# Patient Record
Sex: FEMALE | Race: ASIAN | NOT HISPANIC OR LATINO | URBAN - METROPOLITAN AREA
[De-identification: names, ages, dates, MRNs, and addresses within clinical notes are randomized per-mention and may not be internally consistent; named-entity substitution may affect disease eponyms.]

---

## 2024-06-10 ENCOUNTER — EMERGENCY (EMERGENCY)
Facility: HOSPITAL | Age: 34
LOS: 1 days | Discharge: PSYCHIATRIC OR SUBSTANCE USE FACILITY | End: 2024-06-10
Attending: STUDENT IN AN ORGANIZED HEALTH CARE EDUCATION/TRAINING PROGRAM
Payer: MEDICAID

## 2024-06-10 VITALS
SYSTOLIC BLOOD PRESSURE: 117 MMHG | OXYGEN SATURATION: 98 % | HEART RATE: 73 BPM | DIASTOLIC BLOOD PRESSURE: 78 MMHG | RESPIRATION RATE: 16 BRPM | TEMPERATURE: 98 F

## 2024-06-10 LAB
AMPHET UR-MCNC: NEGATIVE — SIGNIFICANT CHANGE UP
ANION GAP SERPL CALC-SCNC: 18 MMOL/L — HIGH (ref 5–17)
APAP SERPL-MCNC: <15 UG/ML — SIGNIFICANT CHANGE UP (ref 10–30)
APPEARANCE UR: CLEAR — SIGNIFICANT CHANGE UP
BARBITURATES UR SCN-MCNC: NEGATIVE — SIGNIFICANT CHANGE UP
BASOPHILS # BLD AUTO: 0.03 K/UL — SIGNIFICANT CHANGE UP (ref 0–0.2)
BASOPHILS NFR BLD AUTO: 0.4 % — SIGNIFICANT CHANGE UP (ref 0–2)
BENZODIAZ UR-MCNC: NEGATIVE — SIGNIFICANT CHANGE UP
BILIRUB UR-MCNC: NEGATIVE — SIGNIFICANT CHANGE UP
BUN SERPL-MCNC: 10 MG/DL — SIGNIFICANT CHANGE UP (ref 7–23)
CALCIUM SERPL-MCNC: 9.7 MG/DL — SIGNIFICANT CHANGE UP (ref 8.4–10.5)
CHLORIDE SERPL-SCNC: 99 MMOL/L — SIGNIFICANT CHANGE UP (ref 96–108)
CO2 SERPL-SCNC: 22 MMOL/L — SIGNIFICANT CHANGE UP (ref 22–31)
COCAINE METAB.OTHER UR-MCNC: NEGATIVE — SIGNIFICANT CHANGE UP
COLOR SPEC: YELLOW — SIGNIFICANT CHANGE UP
CREAT SERPL-MCNC: 0.58 MG/DL — SIGNIFICANT CHANGE UP (ref 0.5–1.3)
DIFF PNL FLD: NEGATIVE — SIGNIFICANT CHANGE UP
EGFR: 122 ML/MIN/1.73M2 — SIGNIFICANT CHANGE UP
EOSINOPHIL # BLD AUTO: 0.04 K/UL — SIGNIFICANT CHANGE UP (ref 0–0.5)
EOSINOPHIL NFR BLD AUTO: 0.5 % — SIGNIFICANT CHANGE UP (ref 0–6)
ETHANOL SERPL-MCNC: <10 MG/DL — SIGNIFICANT CHANGE UP (ref 0–10)
GLUCOSE SERPL-MCNC: 124 MG/DL — HIGH (ref 70–99)
GLUCOSE UR QL: NEGATIVE MG/DL — SIGNIFICANT CHANGE UP
HCG SERPL-ACNC: <2 MIU/ML — SIGNIFICANT CHANGE UP
HCT VFR BLD CALC: 41.3 % — SIGNIFICANT CHANGE UP (ref 34.5–45)
HGB BLD-MCNC: 13.8 G/DL — SIGNIFICANT CHANGE UP (ref 11.5–15.5)
IMM GRANULOCYTES NFR BLD AUTO: 0.5 % — SIGNIFICANT CHANGE UP (ref 0–0.9)
KETONES UR-MCNC: 15 MG/DL
LEUKOCYTE ESTERASE UR-ACNC: NEGATIVE — SIGNIFICANT CHANGE UP
LYMPHOCYTES # BLD AUTO: 1.11 K/UL — SIGNIFICANT CHANGE UP (ref 1–3.3)
LYMPHOCYTES # BLD AUTO: 14.7 % — SIGNIFICANT CHANGE UP (ref 13–44)
MCHC RBC-ENTMCNC: 32.4 PG — SIGNIFICANT CHANGE UP (ref 27–34)
MCHC RBC-ENTMCNC: 33.4 GM/DL — SIGNIFICANT CHANGE UP (ref 32–36)
MCV RBC AUTO: 96.9 FL — SIGNIFICANT CHANGE UP (ref 80–100)
METHADONE UR-MCNC: NEGATIVE — SIGNIFICANT CHANGE UP
MONOCYTES # BLD AUTO: 0.5 K/UL — SIGNIFICANT CHANGE UP (ref 0–0.9)
MONOCYTES NFR BLD AUTO: 6.6 % — SIGNIFICANT CHANGE UP (ref 2–14)
NEUTROPHILS # BLD AUTO: 5.81 K/UL — SIGNIFICANT CHANGE UP (ref 1.8–7.4)
NEUTROPHILS NFR BLD AUTO: 77.3 % — HIGH (ref 43–77)
NITRITE UR-MCNC: NEGATIVE — SIGNIFICANT CHANGE UP
NRBC # BLD: 0 /100 WBCS — SIGNIFICANT CHANGE UP (ref 0–0)
OPIATES UR-MCNC: NEGATIVE — SIGNIFICANT CHANGE UP
OXYCODONE UR-MCNC: NEGATIVE — SIGNIFICANT CHANGE UP
PCP SPEC-MCNC: SIGNIFICANT CHANGE UP
PCP UR-MCNC: NEGATIVE — SIGNIFICANT CHANGE UP
PH UR: 7.5 — SIGNIFICANT CHANGE UP (ref 5–8)
PLATELET # BLD AUTO: 283 K/UL — SIGNIFICANT CHANGE UP (ref 150–400)
POTASSIUM SERPL-MCNC: 3.6 MMOL/L — SIGNIFICANT CHANGE UP (ref 3.5–5.3)
POTASSIUM SERPL-SCNC: 3.6 MMOL/L — SIGNIFICANT CHANGE UP (ref 3.5–5.3)
PROT UR-MCNC: NEGATIVE MG/DL — SIGNIFICANT CHANGE UP
RBC # BLD: 4.26 M/UL — SIGNIFICANT CHANGE UP (ref 3.8–5.2)
RBC # FLD: 12.5 % — SIGNIFICANT CHANGE UP (ref 10.3–14.5)
SALICYLATES SERPL-MCNC: <2 MG/DL — LOW (ref 15–30)
SARS-COV-2 RNA SPEC QL NAA+PROBE: SIGNIFICANT CHANGE UP
SODIUM SERPL-SCNC: 139 MMOL/L — SIGNIFICANT CHANGE UP (ref 135–145)
SP GR SPEC: 1.01 — SIGNIFICANT CHANGE UP (ref 1–1.03)
THC UR QL: NEGATIVE — SIGNIFICANT CHANGE UP
UROBILINOGEN FLD QL: 0.2 MG/DL — SIGNIFICANT CHANGE UP (ref 0.2–1)
WBC # BLD: 7.53 K/UL — SIGNIFICANT CHANGE UP (ref 3.8–10.5)
WBC # FLD AUTO: 7.53 K/UL — SIGNIFICANT CHANGE UP (ref 3.8–10.5)

## 2024-06-10 PROCEDURE — 99285 EMERGENCY DEPT VISIT HI MDM: CPT

## 2024-06-10 NOTE — ED PROVIDER NOTE - PROGRESS NOTE DETAILS
Attending MD Tovar: Reassessed patient, she states her mood is good today.  She states after sleeping she feels much better.  Explained to her preliminary recommendations for voluntary psychiatric admission, at the time of my discussion with patient she is amenable to this.  Asked if she needs anything she states now.  Patient is awaiting transfer to psychiatric facility pending bed availability.  Medically cleared. Ativan 1 mg every 6 hours as needed anxiety is ordered as per psychiatry notation recommendations.

## 2024-06-10 NOTE — ED PROVIDER NOTE - NS ED ROS FT
GENERAL: No fever, no chills  EYES: No change in vision  HEENT: No trouble swallowing or speaking  CARDIAC: No chest pain  PULMONARY: No cough, no SOB  GI: No abdominal pain, no nausea, no vomiting, no diarrhea, no constipation  : No changes in urination  SKIN: No rashes  NEURO: No headache, no numbness  MSK: No joint pain  Otherwise as HPI or negative. Denies pain

## 2024-06-10 NOTE — ED BEHAVIORAL HEALTH NOTE - BEHAVIORAL HEALTH NOTE
================  FOR EACH COLLATERAL   ================  NAME:     NUMBER:  RELATIONSHIP:  RELIABILITY:  Patient gives permission to obtain collateral from _____:  (  ) Yes  (  )  No  Rationale for overriding objection            (  ) Lack of capacity. Details: ________            (  ) Assessing risk of danger to self/others. Details: ________  Rationale for selecting specific collateral source            (  ) Potential to impact risk of danger to self/others and no alternative equivalent. Details:  Collateral has requested that the information provided remain confidential: Yes [  ] No [  ]  Collateral has provided information that patient is/may be unaware of: Yes [  ] No [  ] ================  FOR EACH COLLATERAL   ================  NAME: Good     NUMBER: [phone]   COMMENTS: Mandarin  utilized (Erna ID#: [id number])  RELATIONSHIP:   RELIABILITY: High  Patient gives permission to obtain collateral from :  ( x ) Yes  (  )  No  Rationale for overriding objection            (  ) Lack of capacity. Details: ________            (  ) Assessing risk of danger to self/others. Details: ________  Rationale for selecting specific collateral source            (  ) Potential to impact risk of danger to self/others and no alternative equivalent. Details:  Collateral has requested that the information provided remain confidential: Yes [  ] No [ x ]  Collateral has provided information that patient is/may be unaware of: Yes [  ] No [ x ]    Patient is a 34F domiciled with her  whom she shares a 11yo daughter with, is self-employed as a business owner for a furniture store, no PMHx, no PPHx, no legal/ACS hx, no substance/ETOH use, no access to firearms.      stated that the patient came to the ER via EMS after attempting to visit a local healthcare clinic ([facility]) after experiencing acute onset of anxiety starting ~3 days ago.  states the episode appeared to be abrupt in nature, and is unsure of what stressors may have contributed.  states that a potential stressor may be some challenges patient has been experiencing this past year while running her business.  adds that the patient has experienced 2 infant deaths 10yrs ago 1 month of patient being born.  denies domestic issues at home, states patient's relationship continues to be stable with all family members, and sleep/appetite are stable.  denies patient endorsed SI/HI/AVH, denies past SA/self-injury.      denies acute psychiatric safety concerns.

## 2024-06-10 NOTE — ED PROVIDER NOTE - ATTENDING CONTRIBUTION TO CARE
see note    [id number] Rose Wen   34 F w/ no pmh presents to the er w/ inability to sleep for 1 week, pt had suicidal thoughts per EMS, she denies these thoughts she states she has distrust of others, denies hi, no AH, no VH. pt w/ no arm/leg weakness numbness no lightheadedness  on exam pt is awake and alert oriented x3, has clear lungs soft abdomen, nontoxic appearing      pt to [facility]. imaging and reassessment.   Plan for psych

## 2024-06-10 NOTE — ED PROVIDER NOTE - OBJECTIVE STATEMENT
34-year-old woman 34-year-old woman with no PMH presenting due to feelings of loneliness, distress, not wanting to live anymore.  Patient denies suicidal or homicidal ideation, states that she has felt this way in the past.  Denies fevers, chills, nausea, vomiting, diarrhea, chest pain, SOB, abdominal pain. Denies safety concerns at home, denies feeling that people are trying to hurt her. However despite using , patient evades questions, appears to minimize symptoms.

## 2024-06-10 NOTE — ED PROVIDER NOTE - CLINICAL SUMMARY MEDICAL DECISION MAKING FREE TEXT BOX
Josh, PGY3 -  34-year-old woman presenting with SI, no suicidal attempts or homicidal ideation.  Labs, psych consult, reassess.  Will wait for the psych recommendations. *The above represents an initial assessment/impression. Please refer to progress notes for potential changes in patient clinical course*

## 2024-06-10 NOTE — ED ADULT NURSE NOTE - NSFALLUNIVINTERV_ED_ALL_ED
Bed/Stretcher in lowest position, wheels locked, appropriate side rails in place/Call bell, personal items and telephone in reach/Instruct patient to call for assistance before getting out of bed/chair/stretcher/Non-slip footwear applied when patient is off stretcher/[place] to call system/Physically safe environment - no spills, clutter or unnecessary equipment/Purposeful proactive rounding/Room/bathroom lighting operational, light cord in reach

## 2024-06-11 ENCOUNTER — INPATIENT (INPATIENT)
Facility: HOSPITAL | Age: 34
LOS: 0 days | Discharge: HOME | End: 2024-06-12
Attending: PSYCHIATRY & NEUROLOGY | Admitting: PSYCHIATRY & NEUROLOGY
Payer: MEDICAID

## 2024-06-11 VITALS — WEIGHT: 111.99 LBS | RESPIRATION RATE: 16 BRPM | HEIGHT: 60 IN | TEMPERATURE: 99 F

## 2024-06-11 VITALS
RESPIRATION RATE: 16 BRPM | DIASTOLIC BLOOD PRESSURE: 68 MMHG | TEMPERATURE: 99 F | SYSTOLIC BLOOD PRESSURE: 104 MMHG | OXYGEN SATURATION: 100 % | HEART RATE: 86 BPM

## 2024-06-11 DIAGNOSIS — F41.9 ANXIETY DISORDER, UNSPECIFIED: ICD-10-CM

## 2024-06-11 DIAGNOSIS — F33.9 MAJOR DEPRESSIVE DISORDER, RECURRENT, UNSPECIFIED: ICD-10-CM

## 2024-06-11 PROCEDURE — 99204 OFFICE O/P NEW MOD 45 MIN: CPT

## 2024-06-11 PROCEDURE — 81003 URINALYSIS AUTO W/O SCOPE: CPT

## 2024-06-11 PROCEDURE — 80048 BASIC METABOLIC PNL TOTAL CA: CPT

## 2024-06-11 PROCEDURE — 90792 PSYCH DIAG EVAL W/MED SRVCS: CPT | Mod: 95

## 2024-06-11 PROCEDURE — 85025 COMPLETE CBC W/AUTO DIFF WBC: CPT

## 2024-06-11 PROCEDURE — 84702 CHORIONIC GONADOTROPIN TEST: CPT

## 2024-06-11 PROCEDURE — 93005 ELECTROCARDIOGRAM TRACING: CPT

## 2024-06-11 PROCEDURE — 80307 DRUG TEST PRSMV CHEM ANLYZR: CPT

## 2024-06-11 PROCEDURE — 87635 SARS-COV-2 COVID-19 AMP PRB: CPT

## 2024-06-11 PROCEDURE — 99285 EMERGENCY DEPT VISIT HI MDM: CPT | Mod: 25

## 2024-06-11 RX ORDER — CLONAZEPAM 1 MG
1 TABLET ORAL ONCE
Refills: 0 | Status: DISCONTINUED | OUTPATIENT
Start: 2024-06-11 | End: 2024-06-11

## 2024-06-11 RX ORDER — HYDROXYZINE HCL 10 MG
25 TABLET ORAL EVERY 6 HOURS
Refills: 0 | Status: DISCONTINUED | OUTPATIENT
Start: 2024-06-11 | End: 2024-06-12

## 2024-06-11 RX ORDER — TRAZODONE HCL 50 MG
50 TABLET ORAL AT BEDTIME
Refills: 0 | Status: DISCONTINUED | OUTPATIENT
Start: 2024-06-11 | End: 2024-06-12

## 2024-06-11 RX ORDER — LANOLIN ALCOHOL/MO/W.PET/CERES
3 CREAM (GRAM) TOPICAL AT BEDTIME
Refills: 0 | Status: DISCONTINUED | OUTPATIENT
Start: 2024-06-11 | End: 2024-06-12

## 2024-06-11 RX ADMIN — Medication 50 MILLIGRAM(S): at 20:19

## 2024-06-11 RX ADMIN — Medication 1 MILLIGRAM(S): at 00:59

## 2024-06-11 NOTE — ED BEHAVIORAL HEALTH PROGRESS NOTE - CASE SUMMARY/FORMULATION (CLEARLY DOCUMENT RATIONALE FOR DISPOSITION CHANGE)
This is a 34-y.o. AF patient, Mandarin-speaking, domiciled with  and daughter, employed, with no pmhx and no formal pphx presenting after EMS was activated at her primary care clinic.     Patient on evaluation endorses symptoms consistent with a depressive episode. She also reports distrust in other people which can either be increased anxiety or possibly paranoia. She reports her main stressor is her job. Patient is help-seeking and is requesting help with her symptoms. She has no past psychiatric history. Patient has passive SI but denied any symptoms associated with acute dangerousness including active SI with intent and plan. She was offered voluntary psychiatric admission and accepted. She is appropriate for inpatient level care for safety, stabilization and medication management. Patient admitted to [facility].

## 2024-06-11 NOTE — CHART NOTE - NSCHARTNOTEFT_GEN_A_CORE
Screening Medical Evaluation    Patient Admitted from: [facility] ED    [facility] admitting diagnosis: Recurrent major depressive disorder      PAST MEDICAL & SURGICAL HISTORY:  No significant past medical history  No significant past surgical history    ALLERGIES:  No Known Allergies    SOCIAL HISTORY:  Patient denies any use of tobacco/nicotine, alcohol, or other substances    FAMILY HISTORY:  No known pertinent family history in 1st degree relatives      MEDICATIONS  (STANDING):  traZODone 50 milliGRAM(s) Oral at bedtime    MEDICATIONS  (PRN):  hydrOXYzine hydrochloride 25 milliGRAM(s) Oral every 6 hours PRN anxiety  LORazepam     Tablet 1 milliGRAM(s) Oral every 6 hours PRN severe anxiety/agitation  LORazepam   Injectable 1 milliGRAM(s) IntraMuscular once PRN severe agitation  melatonin. 3 milliGRAM(s) Oral at bedtime PRN Insomnia      Vital Signs Last 24 Hrs  T(C): 36.8 (2024 19:48), Max: 37.1 (2024 12:10)  T(F): 98.2 (2024 19:48), Max: 98.7 (2024 12:10)  HR: 86 (2024 12:10) (63 - 86)  BP: 104/68 (2024 12:10) (97/59 - 114/78)  RR: 16 (2024 14:00) (16 - 18)  SpO2: 100% (2024 12:10) (98% - 100%)      PHYSICAL EXAM:  GENERAL: NAD  HEAD: Atraumatic, Normocephalic  EYES: EOMI, PERRLA, conjunctiva and sclera clear  NECK: Supple, No JVD  CHEST/LUNG: Clear to auscultation bilaterally; No wheeze  HEART: Regular rate and rhythm; No murmurs, rubs, or gallops  EXTREMITIES:  2+ Peripheral Pulses, No clubbing, cyanosis, or edema  PSYCH: AAOx3  NEUROLOGY: non-focal  SKIN: No rashes or lesions      LABS:                        13.8   7.53  )-----------( 283      ( 10 Chago 2024 18:42 )             41.3     06-10    139  |  99  |  10  ----------------------------<  124<H>  3.6   |  22  |  0.58    Ca    9.7      10 Chago 2024 18:42      Urinalysis Basic - ( 10 Chago 2024 18:58 )  Color: Yellow / Appearance: Clear / S.014 / pH: x  Gluc: x / Ketone: 15 mg/dL  / Bili: Negative / Urobili: 0.2 mg/dL   Blood: x / Protein: Negative mg/dL / Nitrite: Negative   Leuk Esterase: Negative / RBC: x / WBC x   Sq Epi: x / Non Sq Epi: x / Bacteria: x      Assessment and Plan:  34F admitted to [facility] for recurrent major depressive disorder w/ no significant PMHx seen at bedside for medical screening evaluation. Patient c/o insomnia, admitted team ordered melatonin and trazodone PRN. Patient has no other acute medical complaints at this time. Patient denies fever, chills, headache, dizziness, lightheadedness, N/V, SOB, cough, congestion, chest pain, abdominal pain, dysuria, hematuria, diarrhea, constipation. Physical exam unremarkable, VSS. Labs WNL. EKG NSR, Qtc 429 as per ED documentation.     1.) Recurrent major depressive disorder: Refer to primary team documentation for recs

## 2024-06-11 NOTE — BH PATIENT PROFILE - NSPROPTRIGHTCAREGIVER_GEN_A_NUR
Patient seen and examined. No interval changes. Left shoulder marked. All questions answered. Ready for surgery. no

## 2024-06-11 NOTE — ED ADULT NURSE REASSESSMENT NOTE - STATUS

## 2024-06-11 NOTE — ED ADULT NURSE REASSESSMENT NOTE - NS ED NURSE REASSESS COMMENT FT1
through mandarin  # [id number] (Willis), it was re-iterated to her that she will be admitted on voluntary status, ED doctor ordered klonopin 1mg po for her anxiety/insomnia. pt. seemed to understood the plan and took her klonopin 1mg po @ 0100. 1:1 CO for s/i maintained.
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
## 2024-06-11 NOTE — ED BEHAVIORAL HEALTH ASSESSMENT NOTE - SUMMARY
Mrs. Euceda is a 35 y/o Mandarin-speaking F, domiciled with  and daughter, employed, with no pmhx and no formal pphx presenting after EMS was activated at her primary care clinic.     Patient on evaluation endorses symptoms consistent with a depressive episode. She also reports distrust in other people which can either be increased anxiety or possibly paranoia. She reports her main stressor is her job. Patient is help-seeking and is requesting help with her symptoms. She has no past psychiatric history. Patient has passive SI but denied any symptoms associated with acute dangerousness including active SI with intent and plan. She was offered voluntary psychiatric admission and accepted. She is appropriate for inpatient level care for safety, stabilization and medication management.
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
## 2024-06-11 NOTE — BH INPATIENT PSYCHIATRY ASSESSMENT NOTE - RISK ASSESSMENT
Patient is at low acute suicide risk, currently denies SIIP. She denies any hx of SA or NSSIB. She denies having access to firearms. She is at elevated chronic suicide risk due to mood disorder & anxiety. She is at low risk for potential violence.

## 2024-06-11 NOTE — BH INPATIENT PSYCHIATRY ASSESSMENT NOTE - ATTENDING COMMENTS
34 year old female, Mandarin-speaking, domiciled with  and daughter, employed, with no PMHx and no formal PPH presenting after EMS was activated at her primary care clinic. On exam, endorses feeling anxious on the unit, states that this is not what she expected. Denies AVh/SI/HI, future oriented, discharge focused. c/w trazodone qhs for sleep

## 2024-06-11 NOTE — ED ADULT NURSE REASSESSMENT NOTE - NS ED NURSE REASSESS COMMENT FT1
observed sleeping most of this shift after she was medicated w/ klonopin 1mg po. 1:1 CO for s/i maintained.

## 2024-06-11 NOTE — BH INPATIENT PSYCHIATRY ASSESSMENT NOTE - NSHPLANGPREFER_GEN_A_CORE
Ramu message on mom;s number only identified voicemail ,to call after hours to further discuss pain, and they can help answer some questions, also puja need to defer some to Dr. Mares for tomorrow..    If uncomfortable  had vaccines today after hours can help.    Wt Readings from Last 1 Encounters:   11/30/20 3.715 kg (<1 %, Z= -2.56)*     * Growth percentiles are based on WHO (Girls, 0-2 years) data.         Mandarin

## 2024-06-11 NOTE — BH INPATIENT PSYCHIATRY ASSESSMENT NOTE - CURRENT PLAN:
None known Ear Wedge Repair Text: A wedge excision was completed by carrying down an excision through the full thickness of the ear and cartilage with an inward facing Burow's triangle. The wound was then closed in a layered fashion.

## 2024-06-11 NOTE — BH INPATIENT PSYCHIATRY ASSESSMENT NOTE - NSBHMETABOLIC_PSY_ALL_CORE_FT
BMI: BMI (kg/m2): 21.9 (06-11-24 @ 14:00)  HbA1c:   Glucose:   BP: --Vital Signs Last 24 Hrs  T(C): 37 (06-11-24 @ 14:00), Max: 37.1 (06-11-24 @ 12:10)  T(F): 98.6 (06-11-24 @ 14:00), Max: 98.7 (06-11-24 @ 12:10)  HR: 86 (06-11-24 @ 12:10) (63 - 86)  BP: 104/68 (06-11-24 @ 12:10) (97/59 - 117/78)  BP(mean): --  RR: 16 (06-11-24 @ 14:00) (16 - 18)  SpO2: 100% (06-11-24 @ 12:10) (98% - 100%)    Orthostatic VS  06-11-24 @ 14:00  Lying BP: --/-- HR: --  Sitting BP: 116/74 HR: 92  Standing BP: 114/73 HR: 92  Site: --  Mode: --    Lipid Panel:  BMI: BMI (kg/m2): 21.9 (06-11-24 @ 14:00)  HbA1c:   Glucose:   BP: --Vital Signs Last 24 Hrs  T(C): 36.2 (06-12-24 @ 08:26), Max: 37 (06-11-24 @ 14:00)  T(F): 97.2 (06-12-24 @ 08:26), Max: 98.6 (06-11-24 @ 14:00)  HR: --  BP: --  BP(mean): --  RR: 16 (06-11-24 @ 14:00) (16 - 16)  SpO2: --    Orthostatic VS  06-12-24 @ 08:26  Lying BP: --/-- HR: --  Sitting BP: 94/68 HR: 75  Standing BP: 97/61 HR: 81  Site: --  Mode: electronic  Orthostatic VS  06-11-24 @ 19:48  Lying BP: --/-- HR: --  Sitting BP: 101/76 HR: 84  Standing BP: 96/68 HR: 94  Site: --  Mode: electronic  Orthostatic VS  06-11-24 @ 14:00  Lying BP: --/-- HR: --  Sitting BP: 116/74 HR: 92  Standing BP: 114/73 HR: 92  Site: --  Mode: --    Lipid Panel:

## 2024-06-11 NOTE — ED BEHAVIORAL HEALTH PROGRESS NOTE - DETAILS:
Patient seen and evaluated, interviewed with  #[id number], staff consulted, chart, labs, meds reviewed, initial evaluation appreciated. Patient reports no significant issues overnight. States there are no changes in mood, energy, appetite, and sleep. No apparent SI/HI, no hopelessness, but helplessness, guilt, ad tearfulness present. Patient also reports considerable anxiety.    As per , patient often has problems with sleep. Insomnia significantly worsens her mood in terms of irritability and depression.    Direction of care discussed with the patient. Counseling and support provided. Legal paperwork discussed/signed.  Patient did not represent a management problem overnight.

## 2024-06-11 NOTE — PSYCHIATRIC REHAB INITIAL EVALUATION - PATIENT'S PREFERRED PRONOUN
Her/She I personally performed the service described in the documentation recorded by the scribe in my presence, and it accurately and completely records my words and actions.

## 2024-06-11 NOTE — BH INPATIENT PSYCHIATRY ASSESSMENT NOTE - NSBHCHARTREVIEWVS_PSY_A_CORE FT
Vital Signs Last 24 Hrs  T(C): 37 (06-11-24 @ 14:00), Max: 37.1 (06-11-24 @ 12:10)  T(F): 98.6 (06-11-24 @ 14:00), Max: 98.7 (06-11-24 @ 12:10)  HR: 86 (06-11-24 @ 12:10) (63 - 86)  BP: 104/68 (06-11-24 @ 12:10) (97/59 - 117/78)  BP(mean): --  RR: 16 (06-11-24 @ 14:00) (16 - 18)  SpO2: 100% (06-11-24 @ 12:10) (98% - 100%)    Orthostatic VS  06-11-24 @ 14:00  Lying BP: --/-- HR: --  Sitting BP: 116/74 HR: 92  Standing BP: 114/73 HR: 92  Site: --  Mode: --   Vital Signs Last 24 Hrs  T(C): 36.2 (06-12-24 @ 08:26), Max: 37 (06-11-24 @ 14:00)  T(F): 97.2 (06-12-24 @ 08:26), Max: 98.6 (06-11-24 @ 14:00)  HR: --  BP: --  BP(mean): --  RR: 16 (06-11-24 @ 14:00) (16 - 16)  SpO2: --    Orthostatic VS  06-12-24 @ 08:26  Lying BP: --/-- HR: --  Sitting BP: 94/68 HR: 75  Standing BP: 97/61 HR: 81  Site: --  Mode: electronic  Orthostatic VS  06-11-24 @ 19:48  Lying BP: --/-- HR: --  Sitting BP: 101/76 HR: 84  Standing BP: 96/68 HR: 94  Site: --  Mode: electronic  Orthostatic VS  06-11-24 @ 14:00  Lying BP: --/-- HR: --  Sitting BP: 116/74 HR: 92  Standing BP: 114/73 HR: 92  Site: --  Mode: --

## 2024-06-11 NOTE — ED ADULT NURSE REASSESSMENT NOTE - DESCRIPTION
Pt is talking to psychiatrist Dr VALENTINO, Pt is being asked to sign legal papers and discussing results of the evaluation
Psych inpt report given to Low 3 RN WILL [phone]
Pt CIWA performed, score=0
[facility] Pt aware of transfer
pt was transferred to [place] [address]  via [facility] ambulance. Pt belongings returned and transported by ambulance, pt valuables envelop was taken by pt . Pt was AOx4, ambulatory at the guille of transfer
Pt awake, ate breakfast, ot said that she feels better and was inquiring on when psychiatry will see her this am. Pt was provided with some magazines as she requested.

## 2024-06-11 NOTE — ED ADULT NURSE REASSESSMENT NOTE - CONDITION
unchanged
unchanged
Dupixent Pregnancy And Lactation Text: This medication likely crosses the placenta but the risk for the fetus is uncertain. This medication is excreted in breast milk.

## 2024-06-11 NOTE — BH INPATIENT PSYCHIATRY ASSESSMENT NOTE - CURRENT MEDICATION
MEDICATIONS  (STANDING):  traZODone 50 milliGRAM(s) Oral at bedtime    MEDICATIONS  (PRN):  hydrOXYzine hydrochloride 25 milliGRAM(s) Oral every 6 hours PRN anxiety  LORazepam     Tablet 1 milliGRAM(s) Oral every 6 hours PRN severe anxiety/agitation  LORazepam   Injectable 1 milliGRAM(s) IntraMuscular once PRN severe agitation   MEDICATIONS  (STANDING):  traZODone 50 milliGRAM(s) Oral at bedtime    MEDICATIONS  (PRN):  hydrOXYzine hydrochloride 25 milliGRAM(s) Oral every 6 hours PRN anxiety  LORazepam     Tablet 1 milliGRAM(s) Oral every 6 hours PRN severe anxiety/agitation  LORazepam   Injectable 1 milliGRAM(s) IntraMuscular once PRN severe agitation  melatonin. 3 milliGRAM(s) Oral at bedtime PRN Insomnia

## 2024-06-11 NOTE — BH INPATIENT PSYCHIATRY ASSESSMENT NOTE - NSBHASSESSSUMMFT_PSY_ALL_CORE
Mrs. Euceda is a 34 year old female, Mandarin-speaking, domiciled with  and daughter, employed, with no PMHx and no formal PPH presenting after EMS was activated at her primary care clinic.        Working diagnosis:  Mood disorder NOS  Anxiety      Plan:  >Legal: 9.13  >Obs: Routine observation, denies active SIIP and is able to engage in safety planning.      >Psychiatric Meds:  Continue Trazodone 50mg, HS for insomnia        >PRN Meds:  Melatonin 3mg PO, QHS for insomnia  Lorazepam 1mg PO, Q6H for severe anxiety      >Labs: Admission labs ordered. Hold antipsychotics if QTc >500.  >Medical: No acute concerns. Patient w/o indication for CIWA, no visible physical symptoms of withdrawal. During the course of treatment, will collaborate with medical team to manage medical issues.  >Diet: Regular  >Social: Milieu/structured therapy  >Treatment Interventions: Groups and Individual Therapy/CBT.  >Dispo: pending symptom improvement, SW to pursue discharge planning.

## 2024-06-11 NOTE — CHART NOTE - NSCHARTNOTEFT_GEN_A_CORE
EMERGENCY : SW consulted by psychiatry as patient requiring voluntary inpatient psychiatric placement. LCSW reviewed patient’s chart. As per chart review patient is a “34-year-old woman with no PMH presenting due to feelings of loneliness, distress, not wanting to live anymore. “ As per ED MD, patient is medically cleared for inpatient psychiatric transfer, voluntary legals completed with psychiatry and present in chart.    LCSW spoke with Addie from [facility] at [facility] who indicates bed availability for patient. Legals and ekg sent as requested. As per Addie patient accepted to [facility] 4 with Dr. Casper as the accepting doctor. LCSW made ED MD, ED RN and psychiatry aware. All parties in agreement. LCSW then spoke with patient and patient’s  at bedside who she identifies as her emergency contact, Sherryirlanda Stewart PH: [phone]. Patient and  are primarily Mandarin speaking, professional interpretation used (Jdguanjiaya ID# [id number]). LCSW made them aware of bed available at [facility]. Patient aware and in agreement. Requests  be provided with address and unit phone number for [facility]. LCSW provided him with this information. Patient and  with 12 year old daughter at home,  confirms that he will care for daughter while patient is in hospital and states no safety concerns present. They state no further questions at present. Contact information provided and ongoing social work availability ensured.    Patient’s RN provided handoff to [facility] 4 and arranged ambulance transportation with [facility]. LCSW created transfer packet with original legals, ekg, nonemergent ,face sheet,  assessment and transportation forms. Packet placed in chart, RN aware. Telepsych email sent. Transfer dispo completed by ED MD in [place]. Patient with [place] [employer], auth to follow. Social work continues to remain available as needed.

## 2024-06-11 NOTE — ED BEHAVIORAL HEALTH ASSESSMENT NOTE - HPI (INCLUDE ILLNESS QUALITY, SEVERITY, DURATION, TIMING, CONTEXT, MODIFYING FACTORS, ASSOCIATED SIGNS AND SYMPTOMS)
Mrs. Euceda is a 33 y/o Mandarin-speaking F, domiciled with  and daughter, employed, with no pmhx and no formal pphx presenting after EMS was activated at her primary care clinic.    In the ED she was cooperative but visibly anxious. Interview was conducted using [employer] . She began interview saying she feels safe in the hospital. She said she was brought to hospital by ambulance. She reported she went to a clinic this afternoon, and they said it would take 2-3 months to see a psychiatrist and she said "somehow 911 was called". She reports she feels very helpless, and that she "desperately needs help". She told the doctor in the clinic that she needs to come to the hospital and that she had a "break down". She said she feels "so helpless in this world". She says is under a lot of stress, and says she has gone through a lot this week, and was vague and said someone betrayed her at work, and since then she feels worthless and lost. She says she wishes she was not alive, but denied she would do anything right now to end her life. When asked if she has a plan to end her life she said her "workplace is her only hope and that someone betrayed her at work and that she is worthless and very disappointed".  She reports she is unable to sleep, and has been getting 4-5 hours of sleep over the past few days. Last night she took medicine to sleep which did not help so she drank wine to sleep, she could not say how much wine she drank, and then said she drank liquor. She says she sometimes drinks every day. She said she drank every day last week and then said "I do not remember" and "I feel very nervous". She denied any AH. She denied any substance use. She states she feels "very scared in a very negative way".     PPhx: She denied ever having a suicide attempt. She has never had a psychiatric hospitalization. She denied ever being on any psychiatric medication.    Social hx: Has one 12 year old daughter. Has a . [place] the [place] from [place]. 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    Family hx: Denied any family hx of psychiatric illness.    Medical hx: She denied any medical illnesses.     She is amenable to voluntary admission    She requested something for sleep

## 2024-06-12 VITALS — TEMPERATURE: 97 F | RESPIRATION RATE: 17 BRPM

## 2024-06-12 PROCEDURE — 99232 SBSQ HOSP IP/OBS MODERATE 35: CPT

## 2024-06-12 RX ORDER — TRAZODONE HCL 50 MG
1 TABLET ORAL
Qty: 30 | Refills: 0
Start: 2024-06-12 | End: 2024-07-11

## 2024-06-12 NOTE — BH DISCHARGE NOTE NURSING/SOCIAL WORK/PSYCH REHAB - NSDCPRGOAL_PSY_ALL_CORE
Pt submitted 3DLs and left against medical adivce and referral. Pt made some progress towards her discharge. Pt has identified her coping skills such as talk to her , exercise, go out with friends, listen to music and travel to manage symptom. Pt currently denies SI, HI, AH and VH. Pt has been complaint with treatment. Pt attended one group and she was quiet, participated minimally. Pt was visible on the unit, interact well with selective peers. Pt showed fair ADLs. Pt has participated in her safety plan.   Pt submitted 3DLs and left against medical advice and referral. Pt made some progress towards her discharge. Pt has identified her coping skills such as talk to her , exercise, go out with friends, listen to music and travel to manage symptom. Pt currently denies SI, HI, AH and VH. Pt has been complaint with medication/treatment. Pt attended one group and she was quiet, participated minimally. Pt was visible on the unit, interact well with selective peers. Pt showed fair ADLs. Pt has participated in her safety plan.

## 2024-06-12 NOTE — BH DISCHARGE NOTE NURSING/SOCIAL WORK/PSYCH REHAB - PATIENT PORTAL LINK FT
You can access the FollowMyHealth Patient Portal offered by [facility] by registering at the following website: [email or web address]. By joining [facility]’s FollowMyHealth portal, you will also be able to view your health information using other applications (apps) compatible with our system.

## 2024-06-12 NOTE — BH DISCHARGE NOTE NURSING/SOCIAL WORK/PSYCH REHAB - DISCHARGE INSTRUCTIONS AFTERCARE APPOINTMENTS
In order to check the location, date, or time of your aftercare appointment, please refer to your Discharge Instructions Document given to you upon leaving the hospital.  If you have lost the instructions please call [phone]

## 2024-06-12 NOTE — BH INPATIENT PSYCHIATRY DISCHARGE NOTE - NSBHMETABOLIC_PSY_ALL_CORE_FT
BMI: BMI (kg/m2): 21.9 (06-11-24 @ 14:00)  HbA1c:   Glucose:   BP: --Vital Signs Last 24 Hrs  T(C): 36.2 (06-12-24 @ 08:26), Max: 37.1 (06-11-24 @ 12:10)  T(F): 97.2 (06-12-24 @ 08:26), Max: 98.7 (06-11-24 @ 12:10)  HR: 86 (06-11-24 @ 12:10) (86 - 86)  BP: 104/68 (06-11-24 @ 12:10) (104/68 - 104/68)  BP(mean): --  RR: 16 (06-11-24 @ 14:00) (16 - 16)  SpO2: 100% (06-11-24 @ 12:10) (100% - 100%)    Orthostatic VS  06-12-24 @ 08:26  Lying BP: --/-- HR: --  Sitting BP: 94/68 HR: 75  Standing BP: 97/61 HR: 81  Site: --  Mode: electronic  Orthostatic VS  06-11-24 @ 19:48  Lying BP: --/-- HR: --  Sitting BP: 101/76 HR: 84  Standing BP: 96/68 HR: 94  Site: --  Mode: electronic  Orthostatic VS  06-11-24 @ 14:00  Lying BP: --/-- HR: --  Sitting BP: 116/74 HR: 92  Standing BP: 114/73 HR: 92  Site: --  Mode: --    Lipid Panel:

## 2024-06-12 NOTE — BH DISCHARGE NOTE NURSING/SOCIAL WORK/PSYCH REHAB - NSCDUDCCRISIS_PSY_A_CORE
[facility]  1 [phone] [facility]-WELL [phone])  Text "WELL" to [phone]  Website: www.[employer]/.National Suicide Prevention Lifeline 1 (743) 218-6404/.  [employer]  1 (424) LIF[employer] (993-4281)/.  [facility]  [address] [place] [address] [phone]   Hours:  Monday through Friday from 9 AM to 3 PM

## 2024-06-12 NOTE — BH INPATIENT PSYCHIATRY DISCHARGE NOTE - HPI (INCLUDE ILLNESS QUALITY, SEVERITY, DURATION, TIMING, CONTEXT, MODIFYING FACTORS, ASSOCIATED SIGNS AND SYMPTOMS)
Mrs. Euceda is a 34 year old female, Mandarin-speaking, domiciled with  and daughter, employed, with no PMHx and no formal PPH presenting after EMS was activated at her primary care clinic.      As per ED behavioral health assessment note:  In the ED she was cooperative but visibly anxious. Interview was conducted using [place] . She began interview saying she feels safe in the hospital. She said she was brought to hospital by ambulance. She reported she went to a clinic this afternoon, and they said it would take 2-3 months to see a psychiatrist and she said "somehow 911 was called". She reports she feels very helpless, and that she "desperately needs help". She told the doctor in the clinic that she needs to come to the hospital and that she had a "break down". She said she feels "so helpless in this world". She says is under a lot of stress, and says she has gone through a lot this week, and was vague and said someone betrayed her at work, and since then she feels worthless and lost. She says she wishes she was not alive, but denied she would do anything right now to end her life. When asked if she has a plan to end her life she said her "workplace is her only hope and that someone betrayed her at work and that she is worthless and very disappointed".  She reports she is unable to sleep, and has been getting 4-5 hours of sleep over the past few days. Last night she took medicine to sleep which did not help so she drank wine to sleep, she could not say how much wine she drank, and then said she drank liquor. She says she sometimes drinks every day. She said she drank every day last week and then said "I do not remember" and "I feel very nervous". She denied any AH. She denied any substance use. She states she feels "very scared in a very negative way".   PPhx: She denied ever having a suicide attempt. She has never had a psychiatric hospitalization. She denied ever being on any psychiatric medication.  Social hx: Has one 12 year old daughter. Has a . [place]. 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  Family hx: Denied any family hx of psychiatric illness.  Medical hx: She denied any medical illnesses.   She is amenable to voluntary admission. She requested something for sleep"      On unit:  Patient was seen and is evaluated in the interview room. She is primarily Mandarin speaking, therefore  was utilized for interview. Corroborated findings from the ED. Pt is logical and linear in thought, although vague concerning events transpiring at work. 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 6/3 pt has been receiving concerning text messages from a coworker who is the owner of the store. She says that she is really close to this coworker and now feels betrayed by him. Reports that she has been receiving these messages either daily or every other day, does not discuss content of text messages. Whenever she is contacted by this coworker or family/friends of this individual she starts feeling "panicked". She believes that this coworker is trying to get her to leave the job, as she has seen other female coworkers being pushed out in this fashion. She states that she wishes to remain at this job, although has been feeling very stressed since last week. Reports having difficulty sleeping, has been getting 4-5 hours nightly. She denies any SHIIP. Denied any AVH. She is agreeable to continuing Trazodone for sleep and discussed Atarax PRN for anxiety.

## 2024-06-12 NOTE — BH SOCIAL WORK INITIAL PSYCHOSOCIAL EVALUATION - OTHER PAST PSYCHIATRIC HISTORY (INCLUDE DETAILS REGARDING ONSET, COURSE OF ILLNESS, INPATIENT/OUTPATIENT TREATMENT)
Mrs. Euceda is a 34 year old female, Mandarin-speaking, domiciled with  and daughter, employed, with no PMHx and no formal PPH presenting after EMS was activated at a mental health clinic.    Patient was seen and is evaluated in the interview room.  was used for interview. Pt was calm during interview and appeared guarded at times during questioning. 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 She reports concerning text messages from a coworker who also owns the store. She reports that these messages are concerning and anxiety-provoking but would not go into details on what these messages were about. She reports feeling betrayed by this coworker as they were close prior to receiving these messages. She reports that she is receiving these messages frequently, almost daily. She thinks that the boss is trying to get the pt to leave the job based on the messages being sent and she has seen other female coworkers treated in similar way who ended up leaving. Pt reports that she likes the job and wants to stay but has been stressed with the ongoing text messages. She reports that her primary reason for coming to the hospital is insomnia and she submitted a 3DL requesting to leave. Pt reports that [facility] and get [facility] appointment with a psychiatrist to help manage her anxiety and insomnia but they told her that the waiting list was 2-3 months. She reports that the staff there then suggested she come to the hospital to get help and that she would only be admitted for 1-2 days. She reports that the clinic activated EMS. Pt denies SI/HI and AH/VH at this time and wants to return home to her  and daughter. LMSW advised pt that an outpatient appointment would not be able to be scheduled for her due to the short nature of her stay and requiring clinics with Mandarin speaking providers. Pt acknowledged this. LMSW told pt that she can go to the crisis center if she feels that she needs psychiatric care and is unable to obtain an appointment at an outpatient clinic. Pt acknowledged this.

## 2024-06-12 NOTE — BH INPATIENT PSYCHIATRY DISCHARGE NOTE - NSDCCPCAREPLAN_GEN_ALL_CORE_FT
PRINCIPAL DISCHARGE DIAGNOSIS  Diagnosis: Adjustment disorder  Assessment and Plan of Treatment:       SECONDARY DISCHARGE DIAGNOSES  Diagnosis: Anxiety  Assessment and Plan of Treatment:

## 2024-06-12 NOTE — BH INPATIENT PSYCHIATRY DISCHARGE NOTE - HOSPITAL COURSE
... Patient is hospitalized on 9.13 legal status with a primary problem of increasing anxiety, worsening mood, and insomnia in the context of stressors related to work.    The patient reported that symptoms started last Mon. 6/3, after receiving a message from a coworker. She verbalizes that this coworker would send her text messages, she is vague in discussing content on these messages. She reported feeling betrayed by these messages, would feel anxious and "panicked" when this individual and family/friends of this coworker would be around her. She reported that she started having trouble sleeping, only getting 4-5 hours on sleep nightly. Does report that sleep prior to these events was better. She denies AVH or delusional thoughts, did no display any psychotic symptoms. She denied depression, no overt depressive sx noted. Denies SHIIP, states "I don't want to die". She was continued on Trazodone 50mg HS for insomnia. She was compliant with medication, noted to have slept well overnight w/o disturbances. Patient able to identify protective factors and is future-oriented. she states that she would like to return to work and if things do not work out will leave to join a business with her . She submitted a 3DL on 6/12. Patient no longer requires inpatient treatment and care. Patient is not judged to be an acute danger to self or others at this time. She will be discharged as per request of 3DL.    PROCEDURES AND TREATMENT:  >Milieu therapy and supportive therapy  >Psychopharmacologic management.  >Motivational counseling.       Medications at discharge:  Psychiatric Medications:   Trazodone 50mg, HS for insomnia      Problem Pertinent Review of Symptoms/Associated Signs and Symptoms: Patient is visible on the unit and is calm, cooperative, pleasant, AAOX3 upon approach. Immediate risk was minimized by inpatient admission to a safe environment with appropriate supervision and limited access to lethal means. On suicide risk assessment at the time of discharge, patient is at elevated chronic risk due the following factors: recent inpatient psychiatric discharge and potential job loss.    Protective factors include patient denying any anxiety, panic attacks, global insomnia, severe anhedonia. Patient denies any suicidal/homicidal ideations, intent, or plan at the time of discharge. Given the mitigation of aforementioned acute risk factors and considerable protective factors, patient's acute risk for self-harm has been minimized and is currently low.   Future risk was minimized before discharge by treatment of anxiety/depressive symptoms, providing relevant patient education, discussing emergency procedures, provided with outpatient referrals. Patient denies all suicidal and aggressive/homicidal ideation, intent and plan. Although the patient remains at their chronic baseline risk of self-harm, such risk cannot be further ameliorated by continued inpatient treatment and the patient is therefore appropriate for discharge.   On the day of discharge, the patient’s mood and affect are normal/euthymic. Patient denies depressed mood and anxiety. Patient is not hopeless. Sleep and appetite have improved. Pt’s motor performance and productivity of speech are WNL. Pt denies symptoms of psychosis including AVH and is at baseline. Patient denies S/H/I/I/P.   There are no other acute risk factors that could be mitigated by further inpatient hospitalization. Patient is not deemed to be an imminent danger to self or others at the time of discharge. The patient has been instructed that should she develop thoughts of self-harm, suicidal thoughts, homicidal thoughts, aggression, agitation or any other distressing psychiatric symptoms, she should call 911 or go the emergency room. The patient has expressed understanding and is in agreement with the above plan.   Discharge Pan:  -Risk, benefits and alternatives discussed with patient. Patient verbalized understanding and in accord with aftercare recommendations.   -Patient instructed to call 911 or go to nearest ER in case of emergency.   -Patient given Suicide Prevention Lifeline number 6-564-479-5100 and provided instructions on its use  -Patient provided with outpatient referrals

## 2024-06-12 NOTE — BH SAFETY PLAN - ENVIRONMENT SAFETY 1:
talk to my  
Travis Gayle)  Pediatrics  [address] [place] [address]  Phone: [phone]  Fax: [phone]  Follow Up Time: 1-3 Days    Milton Thao)  Pediatrics  [address] [place] [address]  Phone: [phone]  Fax: [phone]  Established Patient  Follow Up Time: 1-3 Days

## 2024-06-12 NOTE — BH INPATIENT PSYCHIATRY DISCHARGE NOTE - NSBHASSESSSUMMFT_PSY_ALL_CORE
... Mrs. Euceda is a 34 year old female, Mandarin-speaking, domiciled with  and daughter, employed, with no PMHx and no formal PPH presenting after EMS was activated at her primary care clinic.      6/12: On assessment, pt w/o overt depressive sx or anxiety. Was able to sleep well overnight w/ Trazodone. She denies SHIIP. Submitted 3DL today, pt to be discharged today.      Working diagnosis:  Adjustment disorder  Anxiety      Plan:  >Discharge patient       >Psychiatric Meds:  Continue Trazodone 50mg, HS for insomnia      >Dispo: Home

## 2024-06-12 NOTE — BH INPATIENT PSYCHIATRY DISCHARGE NOTE - NSBHFUPINTERVALHXFT_PSY_A_CORE
... Patient is followed up for mood/insomnia. Chart, medications and labs reviewed. Patient is discussed during morning brief, no overnight events, has been in good behavioral control.   Patient was seen and is evaluated on the unit. She reports having good sleep and appetite. She states that she was able to sleep well overnight for 7 hours after taking Trazodone. Reports that she wanted to be hospitalized primarily due to insomnia. We discussed alternatives for worsening anxiety/sleep instead of seeking hospitalization. Provided education on sleep hygiene. 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 She denies any SIIP. Denies overt depressive or anxiety. Patient submitted 3DL following conversation, pt to be discharged home today w/ script of Trazodone.  Patient is followed up for mood/insomnia. Chart, medications and labs reviewed. Patient is discussed during morning brief, no overnight events, has been in good behavioral control.   Patient was seen and is evaluated on the unit. Interview conducted with Wen Julian #[id number]. She reports having good sleep and appetite. She states that she was able to sleep well overnight for 7 hours after taking Trazodone. Reports that she wanted to be hospitalized primarily due to insomnia. We discussed alternatives for worsening anxiety/sleep instead of seeking hospitalization. Provided education on sleep hygiene. 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 She denies any SIIP. Denies overt depressive or anxiety. Patient submitted 3DL following conversation, pt to be discharged home today w/ script of Trazodone.
